# Patient Record
Sex: FEMALE | Race: WHITE | NOT HISPANIC OR LATINO | Employment: FULL TIME
[De-identification: names, ages, dates, MRNs, and addresses within clinical notes are randomized per-mention and may not be internally consistent; named-entity substitution may affect disease eponyms.]

---

## 2017-06-24 ENCOUNTER — HEALTH MAINTENANCE LETTER (OUTPATIENT)
Age: 57
End: 2017-06-24

## 2018-02-09 ENCOUNTER — HOSPITAL ENCOUNTER (OUTPATIENT)
Dept: MAMMOGRAPHY | Facility: CLINIC | Age: 58
Discharge: HOME | End: 2018-02-09
Attending: OBSTETRICS & GYNECOLOGY | Admitting: OBSTETRICS & GYNECOLOGY
Payer: COMMERCIAL

## 2018-02-09 DIAGNOSIS — Z12.31 VISIT FOR SCREENING MAMMOGRAM: ICD-10-CM

## 2018-02-09 PROCEDURE — 77067 SCR MAMMO BI INCL CAD: CPT

## 2019-03-27 ENCOUNTER — HOSPITAL ENCOUNTER (OUTPATIENT)
Dept: MAMMOGRAPHY | Facility: CLINIC | Age: 59
Discharge: HOME | End: 2019-03-27
Attending: OBSTETRICS & GYNECOLOGY | Admitting: OBSTETRICS & GYNECOLOGY
Payer: COMMERCIAL

## 2019-03-27 DIAGNOSIS — Z12.31 VISIT FOR SCREENING MAMMOGRAM: ICD-10-CM

## 2019-03-27 PROCEDURE — 77063 BREAST TOMOSYNTHESIS BI: CPT

## 2019-10-02 ENCOUNTER — OFFICE VISIT (OUTPATIENT)
Dept: FAMILY MEDICINE | Facility: CLINIC | Age: 59
End: 2019-10-02

## 2019-10-02 VITALS
WEIGHT: 128 LBS | HEART RATE: 68 BPM | HEIGHT: 67 IN | BODY MASS INDEX: 20.09 KG/M2 | SYSTOLIC BLOOD PRESSURE: 118 MMHG | TEMPERATURE: 97.9 F | OXYGEN SATURATION: 99 % | DIASTOLIC BLOOD PRESSURE: 70 MMHG

## 2019-10-02 DIAGNOSIS — E78.5 HYPERLIPIDEMIA LDL GOAL <130: ICD-10-CM

## 2019-10-02 DIAGNOSIS — Z23 NEED FOR VACCINATION: ICD-10-CM

## 2019-10-02 DIAGNOSIS — Z76.89 HEALTH CARE HOME: ICD-10-CM

## 2019-10-02 DIAGNOSIS — R73.09 ABNORMAL GLUCOSE: ICD-10-CM

## 2019-10-02 LAB
CHOLEST SERPL-MCNC: 237 MG/DL (ref 0–199)
CHOLEST/HDLC SERPL: 3 {RATIO} (ref 0–5)
GLUCOSE SERPL-MCNC: 101 MG/DL (ref 60–99)
HBA1C MFR BLD: 5.7 % (ref 4–7)
HDLC SERPL-MCNC: 83 MG/DL (ref 40–150)
LDLC SERPL CALC-MCNC: 136 MG/DL (ref 0–130)
TRIGL SERPL-MCNC: 88 MG/DL (ref 0–149)

## 2019-10-02 PROCEDURE — 83036 HEMOGLOBIN GLYCOSYLATED A1C: CPT | Performed by: FAMILY MEDICINE

## 2019-10-02 PROCEDURE — 80061 LIPID PANEL: CPT | Mod: GA | Performed by: FAMILY MEDICINE

## 2019-10-02 PROCEDURE — 36415 COLL VENOUS BLD VENIPUNCTURE: CPT | Performed by: FAMILY MEDICINE

## 2019-10-02 PROCEDURE — 90715 TDAP VACCINE 7 YRS/> IM: CPT | Performed by: FAMILY MEDICINE

## 2019-10-02 PROCEDURE — 90471 IMMUNIZATION ADMIN: CPT | Performed by: FAMILY MEDICINE

## 2019-10-02 PROCEDURE — 82947 ASSAY GLUCOSE BLOOD QUANT: CPT | Performed by: FAMILY MEDICINE

## 2019-10-02 PROCEDURE — 99203 OFFICE O/P NEW LOW 30 MIN: CPT | Mod: 25 | Performed by: FAMILY MEDICINE

## 2019-10-02 ASSESSMENT — MIFFLIN-ST. JEOR: SCORE: 1198.23

## 2019-10-02 NOTE — PROGRESS NOTES
"SUBJECTIVE:  58 year old female referred to our clinic by Dr Jung with concerns of high cholesterol and high blood sugar  Past medical history is significant for good health.  She exercises four days a week, (walks)  Nonsmoker  Diet: reviewed- trying to eat less sweets    Family   Mother high cholesterol and hypertension  Father  87 vascular disease, bilateral amputation, nonsmoker, no diabetis, had asthma  Two sisters- one 62, 56 - both in good health    Surgeries:none  Vaginal delivery  One pregnancy: no complications (no hypertension or gestational diabetis)    No medical problems:    Medications:  No prescription medications  Takes calcium- no vitamin D    Works at [employer]    Prefers Lifestyle over medication  Weight unchanged: Body mass index is 20.05 kg/m .    Nonsmoker        ROS: 10 point ROS neg other than the symptoms noted above in the HPI.    /70 (BP Location: Right arm, Patient Position: Sitting, Cuff Size: Adult Regular)   Pulse 68   Temp 97.9  F (36.6  C) (Oral)   Ht 1.702 m (5' 7\")   Wt 58.1 kg (128 lb)   SpO2 99%   BMI 20.05 kg/m     No acute distress  No palpable masses or asymmetry of thyroid  No carotid bruits  Regular rate and  Rhythm.  no significant murmurs, clicks, gallops or rubs. No edema or JVD. Chest is clear; no wheezes or rales.    Lab results:  Hemoglobin A1C 5.7  Glucose   Date Value Ref Range Status   10/02/2019 101 (A) 60 - 99 mg/dL Final      Lab Results   Component Value Date    CHOL 237 10/02/2019     Lab Results   Component Value Date    HDL 83 10/02/2019     Lab Results   Component Value Date     10/02/2019     Lab Results   Component Value Date    TRIG 88 10/02/2019     Lab Results   Component Value Date    CHOLHDLRATIO 3 10/02/2019     Assessment    (R73.09) Abnormal glucose  Comment: mild elevation in fasting glucose and Hemoglobin A1C  Treatment is healthy diet- regular exercise  Repeat fasting glucose in one year  Plan: Hemoglobin A1c (BFP), " VENOUS COLLECTION,         Glucose Fasting (BFP)            (Z13.220) Lipid screening  Comment: mild elevation in LDL cholesterol 138 (goal is less than 130 with her lack of other risk factors but family history)  Treatment is diet and exercise (high fiber, low fat diet)  Repeat fasting lipid profile in one year  Plan: VENOUS COLLECTION, Lipid Panel (BFP), CANCELED:        Lipid Profile            (Z23) Need for vaccination  Comment:   Plan: TDAP VACCINE            (Z76.89) Health Care Home  Comment:   Plan:     Recheck in one year

## 2019-10-02 NOTE — LETTER
October 2, 2019      Ting Copeland  [address] [place] [address]        Dear ,    We are writing to inform you of your test results.    Mild elevation in blood sugar:101 (normal fasting blood sugar is less than 100)  Try to commit to a daily exercise program-  Avoid foods made with sugar or white flour, potatoes, white noodles and white rice.  Carbohydrates in your diet should be primarily fruits, vegetables and whole grains.    Lipid profile:  Excellent HDL or good cholesterol: 83 (goal is above 50- the higher the better)  Normal triglycerides  Mild elevation in LDL or bad cholesterol 136 (goal is less than 130)- will improve with a high fiber, low fat diet and regular exercise     Repeat your fasting blood sugar and lipid profile in one year.      Resulted Orders   Hemoglobin A1c (BFP)   Result Value Ref Range    Hemoglobin A1C 5.7 4.0 - 7.0 %   Lipid Panel (BFP)   Result Value Ref Range    Cholesterol 237 (A) 0 - 199 mg/dL    Triglycerides 88 0 - 149 mg/dL    HDL Cholesterol 83 40 - 150 mg/dL    LDL Cholesterol Direct 136 (A) 0 - 130 mg/dL    Cholesterol/HDL Ratio 3 0 - 5   Glucose Fasting (BFP)   Result Value Ref Range    Glucose 101 (A) 60 - 99 mg/dL       If you have any questions or concerns, please call the clinic at the number listed above.       Sincerely,        Rehana Orr MD

## 2019-10-02 NOTE — LETTER
[facility]  [address]  [address]  [facility] [address]  [phone]      October 2, 2019      Ting Copeland  [address]  [facility] [address]      EMERGENCY CARE PLAN  Presenting Problem Treatment Plan   Questions or concerns during clinic hours I will call the clinic directly:    [facility]  [address] , [address]  [place] [address]  [phone]   Questions or concerns outside clinic hours  I will call the 24 hour line at [phone]   Patient needs to schedule an appointment  I will call the  scheduling line at [phone]   Same day treatment   I will call the clinic first, then  urgent care and/or  express care if needed   Clinic Care Coordinators Lyndsay Díaz RN:  [phone]  [facility] Clinical Support Staff: [phone]    Crisis Services:  Behavioral or Mental Health [facility] ([facility])   [phone]   Emergency treatment--Immediately CALL [phone]

## 2019-10-02 NOTE — NURSING NOTE
Chief Complaint   Patient presents with     Consult     Glucose and Cholesterol were tali at her OB, wans to recheck them, fasting     Pre-visit Screening:  Immunizations:  not up to date - Tdap  Colonoscopy:  NA  Mammogram: is up to date  Asthma Action Test/Plan:  NA  PHQ9:  PHQ-2 given  GAD7:  NA  Questioned patient about current smoking habits Pt. has never smoked.  Ok to leave detailed message on voice mail for today's visit only yes, phone # [phone]

## 2019-10-13 PROBLEM — R73.09 ABNORMAL GLUCOSE: Status: ACTIVE | Noted: 2019-10-13

## 2019-10-13 PROBLEM — E78.5 HYPERLIPIDEMIA LDL GOAL <130: Status: ACTIVE | Noted: 2019-10-13

## 2020-11-24 ENCOUNTER — ALLIED HEALTH/NURSE VISIT (OUTPATIENT)
Dept: FAMILY MEDICINE | Facility: CLINIC | Age: 60
End: 2020-11-24

## 2020-11-24 DIAGNOSIS — Z23 NEED FOR VACCINATION: Primary | ICD-10-CM

## 2020-11-24 PROCEDURE — 90750 HZV VACC RECOMBINANT IM: CPT | Performed by: FAMILY MEDICINE

## 2020-11-24 PROCEDURE — 90471 IMMUNIZATION ADMIN: CPT | Performed by: FAMILY MEDICINE

## 2021-01-21 ENCOUNTER — IMMUNIZATION (OUTPATIENT)
Dept: PEDIATRICS | Facility: CLINIC | Age: 61
End: 2021-01-21
Payer: COMMERCIAL

## 2021-01-21 PROCEDURE — 91300 PR COVID VAC PFIZER DIL RECON 30 MCG/0.3 ML IM: CPT

## 2021-01-21 PROCEDURE — 0001A PR COVID VAC PFIZER DIL RECON 30 MCG/0.3 ML IM: CPT

## 2021-01-24 ENCOUNTER — HEALTH MAINTENANCE LETTER (OUTPATIENT)
Age: 61
End: 2021-01-24

## 2021-02-02 ENCOUNTER — HOSPITAL ENCOUNTER (OUTPATIENT)
Dept: MAMMOGRAPHY | Facility: CLINIC | Age: 61
Discharge: HOME | End: 2021-02-02
Attending: OBSTETRICS & GYNECOLOGY | Admitting: OBSTETRICS & GYNECOLOGY
Payer: COMMERCIAL

## 2021-02-02 DIAGNOSIS — Z12.31 VISIT FOR SCREENING MAMMOGRAM: ICD-10-CM

## 2021-02-02 PROCEDURE — 77063 BREAST TOMOSYNTHESIS BI: CPT

## 2021-02-11 ENCOUNTER — IMMUNIZATION (OUTPATIENT)
Dept: PEDIATRICS | Facility: CLINIC | Age: 61
End: 2021-02-11
Attending: INTERNAL MEDICINE
Payer: COMMERCIAL

## 2021-02-11 PROCEDURE — 0002A PR COVID VAC PFIZER DIL RECON 30 MCG/0.3 ML IM: CPT

## 2021-02-11 PROCEDURE — 91300 PR COVID VAC PFIZER DIL RECON 30 MCG/0.3 ML IM: CPT

## 2021-02-12 ENCOUNTER — OFFICE VISIT (OUTPATIENT)
Dept: FAMILY MEDICINE | Facility: CLINIC | Age: 61
End: 2021-02-12

## 2021-02-12 VITALS
SYSTOLIC BLOOD PRESSURE: 142 MMHG | HEIGHT: 67 IN | WEIGHT: 133.6 LBS | BODY MASS INDEX: 20.97 KG/M2 | HEART RATE: 84 BPM | DIASTOLIC BLOOD PRESSURE: 80 MMHG | TEMPERATURE: 98.3 F

## 2021-02-12 DIAGNOSIS — Z71.89 ACP (ADVANCE CARE PLANNING): ICD-10-CM

## 2021-02-12 DIAGNOSIS — N30.01 ACUTE CYSTITIS WITH HEMATURIA: Primary | ICD-10-CM

## 2021-02-12 LAB
ALBUMIN (URINE): ABNORMAL MG/DL
APPEARANCE UR: ABNORMAL
BACTERIA, UR: ABNORMAL
BILIRUB UR QL: ABNORMAL
CASTS/LPF: 0
COLOR UR: YELLOW
EP/HPF: ABNORMAL
GLUCOSE URINE: ABNORMAL MG/DL
HGB UR QL: ABNORMAL
KETONES UR QL: ABNORMAL MG/DL
LEUKOCYTE ESTERASE - QUEST: ABNORMAL
MISC.: ABNORMAL
NITRITE UR QL STRIP: ABNORMAL
PH UR STRIP: 6 PH (ref 5–7)
RBC, UR MICRO: ABNORMAL (ref ?–2)
SP. GRAVITY: >=1.03
UROBILINOGEN UR QL STRIP: 0.2 EU/DL (ref 0.2–1)
WBC, UR MICRO: ABNORMAL (ref ?–2)

## 2021-02-12 PROCEDURE — 81001 URINALYSIS AUTO W/SCOPE: CPT | Performed by: PHYSICIAN ASSISTANT

## 2021-02-12 PROCEDURE — 87186 SC STD MICRODIL/AGAR DIL: CPT | Mod: 90 | Performed by: PHYSICIAN ASSISTANT

## 2021-02-12 PROCEDURE — 87088 URINE BACTERIA CULTURE: CPT | Mod: 90 | Performed by: PHYSICIAN ASSISTANT

## 2021-02-12 PROCEDURE — 87086 URINE CULTURE/COLONY COUNT: CPT | Mod: 90 | Performed by: PHYSICIAN ASSISTANT

## 2021-02-12 PROCEDURE — 99213 OFFICE O/P EST LOW 20 MIN: CPT | Performed by: PHYSICIAN ASSISTANT

## 2021-02-12 PROCEDURE — 87077 CULTURE AEROBIC IDENTIFY: CPT | Mod: 90 | Performed by: PHYSICIAN ASSISTANT

## 2021-02-12 RX ORDER — SULFAMETHOXAZOLE/TRIMETHOPRIM 800-160 MG
1 TABLET ORAL 2 TIMES DAILY
Qty: 10 TABLET | Refills: 0 | Status: SHIPPED | OUTPATIENT
Start: 2021-02-12 | End: 2021-02-17

## 2021-02-12 ASSESSMENT — MIFFLIN-ST. JEOR: SCORE: 1213.64

## 2021-02-12 NOTE — NURSING NOTE
Ting Copeland is here for a possible UTI.  Questioned patient about current smoking habits.  Pt. has never smoked.  PULSE regular  My Chart: active  CLASSIFICATION OF OVERWEIGHT AND OBESITY BY BMI                        Obesity Class           BMI(kg/m2)  Underweight                                    < 18.5  Normal                                         18.5-24.9  Overweight                                     25.0-29.9  OBESITY                     I                  30.0-34.9                             II                 35.0-39.9  EXTREME OBESITY             III                >40                            Patient's  BMI Body mass index is 20.92 kg/m .  http://hin.nhlbi.nih.gov/menuplanner/menu.cgi  Pre-visit planning  Immunizations - up to date  Colonoscopy -   Mammogram -   Asthma -   PHQ9 -    ROSEANN-7 -

## 2021-02-12 NOTE — PROGRESS NOTES
"CC: UTI    History:  This morning waking up noticed more urinary urgency, as well as frequency. Some abdominal pressure, but no back pain. Denies any pain, Denies any changes to urine. No vaginal discharge, discomfort, itching. Had 2nd covid-19 vaccine yesterday. Did have some achiness and chills early this morning, but no fever.     Does have a history of pyelonephritis 6 years ago.    PMH, MEDICATIONS, ALLERGIES, SOCIAL AND FAMILY HISTORY in [facility] and reviewed by me personally.    ROS negative other than the symptoms noted above in the HPI.      Examination   BP (!) 142/80 (BP Location: Left arm, Patient Position: Chair, Cuff Size: Adult Regular)   Pulse 84   Temp 98.3  F (36.8  C) (Oral)   Ht 1.702 m (5' 7\")   Wt 60.6 kg (133 lb 9.6 oz)   BMI 20.92 kg/m         Constitutional: Sitting comfortably, in no acute distress. Vital signs noted  Neck:  no adenopathy, trachea midline and normal to palpation  Cardiovascular:  regular rate and rhythm, no murmurs, clicks, or gallops  Respiratory:  normal respiratory rate and rhythm, lungs clear to auscultation  M/S: No CVA tenderness  SKIN: No jaundice/pallor/rash.   Psychiatric: mentation appears normal and affect normal/bright        A/P    ICD-10-CM    1. Acute cystitis with hematuria  N30.01 HCL  Urinalysis, Routine (BFP)     Urine Culture Aerobic Bacterial     sulfamethoxazole-trimethoprim (BACTRIM DS) 800-160 MG tablet   2. ACP (advance care planning)  Z71.89        DISCUSSION:  UA consistent with cystitis. Culture pending. Will MyChart with results when available. Recommended 5 day course of Bactrim. Warned of side effects, take with food, consider probiotic/active culture yogurt in between.     follow up visit: As needed    Yolanda Payton PA-C  [facility]    "

## 2021-02-15 LAB — URINE VOIDED CULTURE: ABNORMAL

## 2021-09-04 ENCOUNTER — HEALTH MAINTENANCE LETTER (OUTPATIENT)
Age: 61
End: 2021-09-04

## 2022-02-19 ENCOUNTER — HEALTH MAINTENANCE LETTER (OUTPATIENT)
Age: 62
End: 2022-02-19

## 2022-05-04 ENCOUNTER — HOSPITAL ENCOUNTER (OUTPATIENT)
Dept: MAMMOGRAPHY | Facility: CLINIC | Age: 62
Discharge: HOME | End: 2022-05-04
Attending: OBSTETRICS & GYNECOLOGY | Admitting: OBSTETRICS & GYNECOLOGY
Payer: COMMERCIAL

## 2022-05-04 DIAGNOSIS — Z12.31 VISIT FOR SCREENING MAMMOGRAM: ICD-10-CM

## 2022-05-04 PROCEDURE — 77067 SCR MAMMO BI INCL CAD: CPT

## 2022-10-22 ENCOUNTER — HEALTH MAINTENANCE LETTER (OUTPATIENT)
Age: 62
End: 2022-10-22

## 2022-12-15 ENCOUNTER — OFFICE VISIT (OUTPATIENT)
Dept: FAMILY MEDICINE | Facility: CLINIC | Age: 62
End: 2022-12-15

## 2022-12-15 VITALS
HEART RATE: 74 BPM | SYSTOLIC BLOOD PRESSURE: 148 MMHG | HEIGHT: 68 IN | DIASTOLIC BLOOD PRESSURE: 86 MMHG | TEMPERATURE: 97.1 F | WEIGHT: 134 LBS | OXYGEN SATURATION: 99 % | BODY MASS INDEX: 20.31 KG/M2

## 2022-12-15 DIAGNOSIS — R30.0 DYSURIA: Primary | ICD-10-CM

## 2022-12-15 LAB
APPEARANCE UR: CLEAR
BACTERIA, UR: ABNORMAL
BILIRUB UR QL: ABNORMAL
CASTS/LPF: ABNORMAL
COLOR UR: YELLOW
EP/HPF: ABNORMAL
GLUCOSE URINE: ABNORMAL MG/DL
HGB UR QL: ABNORMAL
KETONES UR QL: ABNORMAL MG/DL
MISC.: ABNORMAL
NITRITE UR QL STRIP: ABNORMAL
PH UR STRIP: 6 PH (ref 5–7)
PROT UR QL: ABNORMAL MG/DL
RBC, UR MICRO: ABNORMAL (ref ?–2)
SP GR UR STRIP: 1.01 (ref 1–1.03)
UROBILINOGEN UR QL STRIP: 0.2 EU/DL (ref 0.2–1)
WBC #/AREA URNS HPF: ABNORMAL /[HPF]
WBC, UR MICRO: ABNORMAL (ref ?–2)

## 2022-12-15 PROCEDURE — 81001 URINALYSIS AUTO W/SCOPE: CPT | Performed by: FAMILY MEDICINE

## 2022-12-15 PROCEDURE — 99213 OFFICE O/P EST LOW 20 MIN: CPT | Performed by: FAMILY MEDICINE

## 2022-12-15 NOTE — NURSING NOTE
Chief Complaint   Patient presents with     UTI     Urgency to urinate on and off for a week, has cut back on caffeine intake         Pre-visit Screening:  Immunizations:  up to date- declines flu vac today  Colonoscopy:  is up to date-- need records from [facility]  Mammogram: NA  Asthma Action Test/Plan:  PINO  PHQ9:  PINO  GAD7:  PINO  Questioned patient about current smoking habits Pt. has never smoked.  Ok to leave detailed message on voice mail for today's visit only Yes, phone # [phone]

## 2022-12-15 NOTE — PROGRESS NOTES
SUBJECTIVE: Ting Copeland is a 62 year old female who complains of urinary frequency, urgency without dysuria x 7 days, without flank pain, fever, chills, or abnormal vaginal discharge or bleeding.     Pt leaving for [place] for [place] and wanted checked    Patient Active Problem List   Diagnosis     Health Care Home     Abnormal glucose     Hyperlipidemia LDL goal <130     ACP (advance care planning)     No past medical history on file.  Family History   Problem Relation Age of Onset     Hypertension Mother      Hyperlipidemia Mother      Vascular Disease Father      Asthma Father      Social History     Socioeconomic History     Marital status:      Spouse name: Not on file     Number of children: Not on file     Years of education: Not on file     Highest education level: Not on file   Occupational History     Not on file   Tobacco Use     Smoking status: Never     Smokeless tobacco: Never   Substance and Sexual Activity     Alcohol use: Yes     Comment: socially     Drug use: Not on file     Sexual activity: Not on file   Other Topics Concern     Not on file   Social History Narrative     Not on file     Social Determinants of Health     Financial Resource Strain: Not on file   Food Insecurity: Not on file   Transportation Needs: Not on file   Physical Activity: Not on file   Stress: Not on file   Social Connections: Not on file   Intimate Partner Violence: Not on file   Housing Stability: Not on file     Past Surgical History:   Procedure Laterality Date     NO HISTORY OF SURGERY       No current outpatient medications on file prior to visit.  No current facility-administered medications on file prior to visit.       Allergies: Patient has no known allergies.    Immunization History   Administered Date(s) Administered     COVID-19 Vaccine 12+ (Pfizer) 01/21/2021, 02/11/2021, 11/14/2021     COVID-19 Vaccine Bivalent Booster 12+ (Pfizer) 10/08/2022     HepB-Adult 11/07/2014, 01/02/2015, 03/25/2015     TDAP  Vaccine (Boostrix) 10/02/2019     Td (Adult), Adsorbed 06/28/2004     Zoster vaccine recombinant adjuvanted (SHINGRIX) 11/24/2020, 04/09/2021        OBJECTIVE: Appears well, in no apparent distress.  Vital signs are normal. The abdomen is soft without tenderness, guarding, mass, rebound or organomegaly. No CVA tenderness or inguinal adenopathy noted. Urine dipstick shows negative for all components.  Micro exam: negative for WBC's or RBC's.     ASSESSMENT: Urinary urgency/frequency-normal UA-? Some vaginal atrophy but no pain    PLAN: push fluids, reassurred, f/u if not improving or worsening

## 2023-02-15 ENCOUNTER — OFFICE VISIT (OUTPATIENT)
Dept: FAMILY MEDICINE | Facility: CLINIC | Age: 63
End: 2023-02-15

## 2023-02-15 VITALS
SYSTOLIC BLOOD PRESSURE: 136 MMHG | DIASTOLIC BLOOD PRESSURE: 80 MMHG | HEART RATE: 67 BPM | HEIGHT: 68 IN | WEIGHT: 135 LBS | BODY MASS INDEX: 20.46 KG/M2 | OXYGEN SATURATION: 97 % | TEMPERATURE: 97 F

## 2023-02-15 DIAGNOSIS — L30.1 DYSHIDROTIC ECZEMA: Primary | ICD-10-CM

## 2023-02-15 PROCEDURE — 99213 OFFICE O/P EST LOW 20 MIN: CPT | Performed by: PHYSICIAN ASSISTANT

## 2023-02-15 RX ORDER — TRIAMCINOLONE ACETONIDE 1 MG/G
CREAM TOPICAL 2 TIMES DAILY
Qty: 30 G | Refills: 0 | Status: SHIPPED | OUTPATIENT
Start: 2023-02-15 | End: 2023-03-01

## 2023-02-15 NOTE — NURSING NOTE
Chief Complaint   Patient presents with     Derm Problem     Left middle toe has been itching and red/bumps- possibly spreading to other toe         Pre-visit Screening:  Immunizations:  up to date  Colonoscopy:  is up to date-- need records from [facility]  Mammogram: will schedule  Asthma Action Test/Plan:  PINO  PHQ9:  NA  GAD7:  NA  Questioned patient about current smoking habits Pt. has never smoked.  Ok to leave detailed message on voice mail for today's visit only Yes, phone # [phone]

## 2023-02-15 NOTE — PROGRESS NOTES
"CC: Foot rash    History:  2.5 week ago, started to have redness and itching at reji of left foot 2nd toe. Small red bump. Since that time, several red bumps and more pronounced. No drainage/discharge. Seemed to spread toward great toe, and this morning looked worse. Has been trying topical Aquaphor, Neosporin, Benedryl without much relief. Did apple cider vinegar warm water soak. Only possible trigger was sitting on feet on cement floor of storage room and started to feel pain. No fever, sweats, chills, SOB, chest pain, spreading rash.     PMH, MEDICATIONS, ALLERGIES, SOCIAL AND FAMILY HISTORY in [facility] and reviewed by me personally.    ROS negative other than the symptoms noted above in the HPI.    Examination   /80 (BP Location: Left arm, Patient Position: Sitting, Cuff Size: Adult Regular)   Pulse 67   Temp 97  F (36.1  C) (Temporal)   Ht 1.715 m (5' 7.5\")   Wt 61.2 kg (135 lb)   SpO2 97%   BMI 20.83 kg/m       Constitutional: Sitting comfortably, in no acute distress. Vital signs noted  Neck:  no adenopathy, trachea midline and normal to palpation, thyroid normal to palpation  Cardiovascular:  regular rate and rhythm, no murmurs, clicks, or gallops  Respiratory:  normal respiratory rate and rhythm, lungs clear to auscultation  M/S: ROM of toes intact. No edema. DP, PT pulses intact and equal bilaterally.  NEURO:  muscle strength normal, sensation to light touch and pinprick normal  SKIN: small erythematous vesicles between base of great toe and second digit dorsal surface. No discharge. Fine scaling.   Psychiatric: mentation appears normal and affect normal/brightmentation appears normal and affect normal/bright    A/P    ICD-10-CM    1. Dyshidrotic eczema  L30.1 triamcinolone (KENALOG) 0.1 % external cream          DISCUSSION:  Suspect this is dyshidrotic eczema. Recommended course of topical triamcinolone twice daily. Warned of possible side effects of topical steroid therapy, and advised to monitor " closely for possibly permanent skin changes like thinning, whitening of the skin, and stop immediately if noted.     Contact me in 1 week if not significantly better, or sooner with worsening. May consider prednisone burst at that time if not resolved/nearly resolved.     follow up visit: As needed    Yolanda Robledo PA-C  [facility]

## 2023-02-20 ENCOUNTER — MYC MEDICAL ADVICE (OUTPATIENT)
Dept: FAMILY MEDICINE | Facility: CLINIC | Age: 63
End: 2023-02-20

## 2023-04-01 ENCOUNTER — HEALTH MAINTENANCE LETTER (OUTPATIENT)
Age: 63
End: 2023-04-01

## 2023-04-26 ENCOUNTER — LAB REQUISITION (OUTPATIENT)
Dept: LAB | Facility: CLINIC | Age: 63
End: 2023-04-26

## 2023-04-26 DIAGNOSIS — Z13.1 ENCOUNTER FOR SCREENING FOR DIABETES MELLITUS: ICD-10-CM

## 2023-04-26 DIAGNOSIS — Z13.220 ENCOUNTER FOR SCREENING FOR LIPOID DISORDERS: ICD-10-CM

## 2023-04-26 LAB
CHOLEST SERPL-MCNC: 244 MG/DL
FASTING STATUS PATIENT QL REPORTED: YES
GLUCOSE SERPL-MCNC: 109 MG/DL (ref 70–99)
HDLC SERPL-MCNC: 80 MG/DL
LDLC SERPL CALC-MCNC: 146 MG/DL
NONHDLC SERPL-MCNC: 164 MG/DL
TRIGL SERPL-MCNC: 91 MG/DL

## 2023-04-26 PROCEDURE — 82947 ASSAY GLUCOSE BLOOD QUANT: CPT | Performed by: OBSTETRICS & GYNECOLOGY

## 2023-04-26 PROCEDURE — 80061 LIPID PANEL: CPT | Performed by: OBSTETRICS & GYNECOLOGY

## 2023-05-05 ENCOUNTER — HOSPITAL ENCOUNTER (OUTPATIENT)
Dept: MAMMOGRAPHY | Facility: CLINIC | Age: 63
Discharge: HOME | End: 2023-05-05
Attending: OBSTETRICS & GYNECOLOGY | Admitting: OBSTETRICS & GYNECOLOGY
Payer: COMMERCIAL

## 2023-05-05 DIAGNOSIS — Z12.31 ENCOUNTER FOR SCREENING MAMMOGRAM FOR MALIGNANT NEOPLASM OF BREAST: ICD-10-CM

## 2023-05-05 PROCEDURE — 77067 SCR MAMMO BI INCL CAD: CPT

## 2024-01-15 ENCOUNTER — OFFICE VISIT (OUTPATIENT)
Dept: FAMILY MEDICINE | Facility: CLINIC | Age: 64
End: 2024-01-15

## 2024-01-15 VITALS
HEIGHT: 68 IN | TEMPERATURE: 97.9 F | WEIGHT: 135 LBS | BODY MASS INDEX: 20.46 KG/M2 | HEART RATE: 68 BPM | DIASTOLIC BLOOD PRESSURE: 74 MMHG | SYSTOLIC BLOOD PRESSURE: 138 MMHG | RESPIRATION RATE: 20 BRPM | OXYGEN SATURATION: 99 %

## 2024-01-15 DIAGNOSIS — J02.9 SORE THROAT: ICD-10-CM

## 2024-01-15 DIAGNOSIS — J02.9 VIRAL PHARYNGITIS: Primary | ICD-10-CM

## 2024-01-15 LAB — STREP A: NEGATIVE

## 2024-01-15 PROCEDURE — 87651 STREP A DNA AMP PROBE: CPT

## 2024-01-15 PROCEDURE — 99213 OFFICE O/P EST LOW 20 MIN: CPT

## 2024-01-15 NOTE — NURSING NOTE
Ting Copeland is here for a sore throat and also left ear pain. Throat for the past 4 days, ears the past 2    Questioned patient about current smoking habits.  Pt. has never smoked.  PULSE regular  My Chart: active  CLASSIFICATION OF OVERWEIGHT AND OBESITY BY BMI                        Obesity Class           BMI(kg/m2)  Underweight                                    < 18.5  Normal                                         18.5-24.9  Overweight                                     25.0-29.9  OBESITY                     I                  30.0-34.9                             II                 35.0-39.9  EXTREME OBESITY             III                >40                            Patient's  BMI Body mass index is 20.83 kg/m .  http://hin.nhlbi.nih.gov/menuplanner/menu.cgi  Pre-visit planning  Immunizations - up to date  Colonoscopy - is up to date  Mammogram - is up to date  Asthma -   PHQ9 -    ROSEANN-7 -      The patient has verbalized that it is ok to leave a detailed voice message on the patient's cell phone with results/recommendations from this visit.

## 2024-01-15 NOTE — PROGRESS NOTES
"Assessment & Plan     1. Viral pharyngitis  - Discussed with Ting she has viral pharyngitis, strep is negative. I did offer to test her for COVID today, however she declines at this time, recommend she does this before her upcoming trip if she is still having symptoms. Recommend she continue to rest, push plenty of fluids (warm liquids such as tea), Ibuprofen and Tylenol for pain control, salt water gargles, etc. Return to clinic and ER precautions discussed.     2. Sore throat  - See above, strep negative.   - Strep A (BFP)    Follow up as needed. Reasons to follow-up sooner or seek emergent care reviewed.     Seda Franklin PA-C  [facility]       Subjective     Ting Copeland is a 63 year old female who presents to clinic today for the following health issues:    HPI   Chief Complaint   Patient presents with     Pharyngitis      Ting presents with concerns of a sore throat. She notes her sore throat started 4 days ago on (01/11/24) and she also has left ear pain that started two days ago. She also has a slight cough and some nasal congestion that started a couple of days ago. She denies any fevers/chills, body aches, right ear pain, wheezing, shortness of breath, chest pain, or any GI symptoms. She denies any decreased hearing, drainage, or tinnitus in her left ear. She has not been around anyone ill that she knows of although notes her and her  both got a head cold that started around the same time. She has been taking Ibuprofen and Zicam for her symptoms. She has not done an at home COVID test. She notes she did have strep once 20 years ago and notes she is leaving to [place] at the end of this week and wants to make sure she doesn't have strep.     She does not take any daily medications.         Objective    /74 (BP Location: Right arm, Patient Position: Chair, Cuff Size: Adult Regular)   Pulse 68   Temp 97.9  F (36.6  C) (Temporal)   Resp 20   Ht 1.715 m (5' 7.5\")   Wt " 61.2 kg (135 lb)   SpO2 99%   BMI 20.83 kg/m    Body mass index is 20.83 kg/m .    Physical Examination:  GENERAL: healthy, alert and no distress  EYES: Eyes grossly normal to inspection, PERRL and conjunctivae and sclerae normal  HENT: ear canals and TM's normal, mild congestion present, posterior oropharyngeal erythema present, tonsils 2+ with no exudates, uvula is midline, mouth free of any ulcers or lesions  NECK: no adenopathy, no asymmetry, masses, or scars and thyroid normal to palpation  RESP: lungs clear to auscultation - no rales, rhonchi or wheezes  CV: regular rate and rhythm, normal S1 S2, no S3 or S4, no murmur, click or rub, no peripheral edema   ABDOMEN: soft, nontender, no masses  MS: no gross musculoskeletal defects noted, no edema  SKIN: no suspicious lesions or rashes  NEURO: Normal strength and tone, mentation intact and speech normal  PSYCH: mentation appears normal, affect normal/bright    Labs reviewed.  Results for orders placed or performed in visit on 01/15/24 (from the past 24 hour(s))   Strep A (BFP)   Result Value Ref Range    STREP A Negative Negative

## 2024-06-02 ENCOUNTER — HEALTH MAINTENANCE LETTER (OUTPATIENT)
Age: 64
End: 2024-06-02

## 2024-06-17 PROBLEM — Z76.89 HEALTH CARE HOME: Status: RESOLVED | Noted: 2019-10-02 | Resolved: 2024-06-17

## 2024-07-10 ENCOUNTER — HOSPITAL ENCOUNTER (OUTPATIENT)
Dept: MAMMOGRAPHY | Facility: CLINIC | Age: 64
Discharge: HOME | End: 2024-07-10
Attending: PHYSICIAN ASSISTANT | Admitting: PHYSICIAN ASSISTANT
Payer: COMMERCIAL

## 2024-07-10 DIAGNOSIS — Z12.31 VISIT FOR SCREENING MAMMOGRAM: ICD-10-CM

## 2024-07-10 PROCEDURE — 77063 BREAST TOMOSYNTHESIS BI: CPT

## 2024-07-24 ENCOUNTER — OFFICE VISIT (OUTPATIENT)
Dept: FAMILY MEDICINE | Facility: CLINIC | Age: 64
End: 2024-07-24

## 2024-07-24 VITALS
WEIGHT: 128 LBS | HEART RATE: 72 BPM | HEIGHT: 68 IN | OXYGEN SATURATION: 99 % | BODY MASS INDEX: 19.4 KG/M2 | SYSTOLIC BLOOD PRESSURE: 130 MMHG | TEMPERATURE: 97.2 F | DIASTOLIC BLOOD PRESSURE: 74 MMHG

## 2024-07-24 DIAGNOSIS — Z82.62 FH: OSTEOPOROSIS: ICD-10-CM

## 2024-07-24 DIAGNOSIS — L65.9 HAIR LOSS: ICD-10-CM

## 2024-07-24 DIAGNOSIS — Z13.820 SCREENING FOR OSTEOPOROSIS: ICD-10-CM

## 2024-07-24 DIAGNOSIS — Z13.220 SCREENING FOR LIPID DISORDERS: ICD-10-CM

## 2024-07-24 DIAGNOSIS — E03.8 SUBCLINICAL HYPOTHYROIDISM: ICD-10-CM

## 2024-07-24 DIAGNOSIS — Z00.00 ENCOUNTER FOR GENERAL HEALTH EXAMINATION: Primary | ICD-10-CM

## 2024-07-24 DIAGNOSIS — Z13.228 SCREENING FOR METABOLIC DISORDER: ICD-10-CM

## 2024-07-24 LAB
BUN SERPL-MCNC: 19 MG/DL (ref 7–25)
BUN/CREATININE RATIO: 23 (ref 6–32)
CALCIUM SERPL-MCNC: 10 MG/DL (ref 8.6–10.3)
CHLORIDE SERPLBLD-SCNC: 102.7 MMOL/L (ref 98–110)
CHOLEST SERPL-MCNC: 276 MG/DL (ref 0–199)
CHOLEST/HDLC SERPL: 3 {RATIO} (ref 0–5)
CO2 SERPL-SCNC: 25.5 MMOL/L (ref 20–32)
CREAT SERPL-MCNC: 0.81 MG/DL (ref 0.6–1.3)
ERYTHROCYTE [DISTWIDTH] IN BLOOD BY AUTOMATED COUNT: 13.6 %
GLUCOSE SERPL-MCNC: 98 MG/DL (ref 60–99)
HCT VFR BLD AUTO: 44.6 % (ref 35–47)
HDLC SERPL-MCNC: 96 MG/DL (ref 40–150)
HEMOGLOBIN: 14.4 G/DL (ref 11.7–15.7)
LDLC SERPL CALC-MCNC: 165 MG/DL
MCH RBC QN AUTO: 28.4 PG (ref 26–33)
MCHC RBC AUTO-ENTMCNC: 32.3 G/DL (ref 31–36)
MCV RBC AUTO: 87.9 FL (ref 78–100)
PLATELET COUNT - QUEST: 153 10^9/L (ref 150–375)
POTASSIUM SERPL-SCNC: 4.13 MMOL/L (ref 3.5–5.3)
RBC # BLD AUTO: 5.07 10*12/L (ref 3.8–5.2)
SODIUM SERPL-SCNC: 138.8 MMOL/L (ref 135–146)
TRIGL SERPL-MCNC: 75 MG/DL (ref 0–149)
WBC # BLD AUTO: 4.5 10*9/L (ref 4–11)

## 2024-07-24 PROCEDURE — 85027 COMPLETE CBC AUTOMATED: CPT | Performed by: PHYSICIAN ASSISTANT

## 2024-07-24 PROCEDURE — 36415 COLL VENOUS BLD VENIPUNCTURE: CPT | Performed by: PHYSICIAN ASSISTANT

## 2024-07-24 PROCEDURE — 99396 PREV VISIT EST AGE 40-64: CPT | Performed by: PHYSICIAN ASSISTANT

## 2024-07-24 PROCEDURE — 80061 LIPID PANEL: CPT | Performed by: PHYSICIAN ASSISTANT

## 2024-07-24 PROCEDURE — 80048 BASIC METABOLIC PNL TOTAL CA: CPT | Performed by: PHYSICIAN ASSISTANT

## 2024-07-24 NOTE — NURSING NOTE
Chief Complaint   Patient presents with    Physical     Annual, fasting, check labs     Pre-visit Screening:  Immunizations:  up to date  Colonoscopy:  is up to date  Mammogram: is up to date  Asthma Action Test/Plan:  na  PHQ9:  phq 2 given  GAD7:  na  Questioned patient about current smoking habits Pt. has never smoked.  Ok to leave detailed message on voice mail for today's visit only yes, phone # [phone] (home) [phone] (work)

## 2024-07-24 NOTE — PROGRESS NOTES
Chief Complaint:  Physical Exam    SUBJECTIVE:   Ting Copeland is a 63 year old female presents for routine health maintenance.    Current concerns: Hair thinning the past 2-3 months- trying Hair LaVie supplement, and Nioxin supplement. No FH history of thyroid disease.     Did have elevated fasting glucose in the past. Has worked on cutting down sugar intake in the past year.     Menses are absent    No LMP recorded. Patient is postmenopausal.    Was last Pap smear normal: Yes  Due for mammogram:  No    Body mass index is 19.75 kg/m .    Present exercise habits:  3-5 times/week  Present dietary habits:  eats regular meals and follows a balanced nutrition diet    Calcium intake:  2-3 servings daily   Vit D intake: is not taking supplement    Is the patient a smoker? No  If yes, smoking cessation advised and counseling provided.     Cardiovascular risk factors: none    Over the past few weeks, have you felt down or depressed? Little interest or pleasure in doing things? No concerns    If in a relationship are there any Domestic violence concern: No    Last dental appointment:  this year  Last optical appointment:  this year    Was the patient born between [id number] and has not had Hep C testing?  Has not been tested, declines testing    I have reviewed the following histories: Past Medical History, Past Surgical History, Social History, Family History, Problem List, Medication List and Allergies    No past medical history on file.  Family History   Problem Relation Age of Onset    Hypertension Mother     Hyperlipidemia Mother     Skin Cancer Mother     Vascular Disease Father         amputation    Asthma Father     Osteoporosis Sister     Skin Cancer Sister     Diabetes Sister     Hyperlipidemia Sister     Skin Cancer Sister     Breast Cancer Paternal Grandmother 80    Breast Cancer Maternal Aunt 60     Social History     Socioeconomic History    Marital status:      Spouse name: Not on file    Number of  "children: Not on file    Years of education: Not on file    Highest education level: Not on file   Occupational History    Not on file   Tobacco Use    Smoking status: Never    Smokeless tobacco: Never   Substance and Sexual Activity    Alcohol use: Yes     Comment: socially, 2-3/week    Drug use: Not on file    Sexual activity: Not on file   Other Topics Concern    Not on file   Social History Narrative    Not on file     Social Determinants of Health     Financial Resource Strain: Not on file   Food Insecurity: Not on file   Transportation Needs: Not on file   Physical Activity: Not on file   Stress: Not on file   Social Connections: Not on file   Interpersonal Safety: Not on file   Housing Stability: Not on file     Past Surgical History:   Procedure Laterality Date    NO HISTORY OF SURGERY         ROS:  E/M: NEGATIVE for ear, nose, mouth and throat problems  R: NEGATIVE for significant/chronic cough or SOB  CV: NEGATIVE for chest pain or palpitations  GI: NEGATIVE for abdominal pain, chronic diarrhea or constipation  :  NEGATIVE for dysuria, hematuria or vaginal discharge. No sexual health concerns.       No current outpatient medications on file.     No current facility-administered medications for this visit.       Patient Active Problem List    Diagnosis Date Noted    ACP (advance care planning) 02/12/2021     Priority: Medium    Abnormal glucose 10/13/2019     Priority: Medium    Hyperlipidemia LDL goal <130 10/13/2019     Priority: Medium       OBJECTIVE:  /74 (BP Location: Left arm, Patient Position: Sitting, Cuff Size: Adult Regular)   Pulse 72   Temp 97.2  F (36.2  C) (Temporal)   Ht 1.715 m (5' 7.5\")   Wt 58.1 kg (128 lb)   SpO2 99%   BMI 19.75 kg/m      General: 63 year old female who appears her stated age. Vital signs noted.  Head: Normocephalic  Eyes: pupils equal round reactive to light and accomodation, extra ocular movements intact  Ears: external canals and TMs free of " "abnormalities  Nose: patent, without mucosal abnormalities  Mouth and throat: without erythema or lesions of the mucosa  Neck: supple, without adenopathy or thyromegaly  Lungs: clear to auscultation, no wheezing or crackles  Breasts: skin without rash, no dominant mass, no nipple discharge, or axillary adenopathy   Cv: regular rate and rhythm, normal s1 and s2 without murmur or click  Abd: soft, non-tender, no masses, no hepatomegaly or splenomegaly.   (female): Declined.  Ms: normal muscle tone & symmetry  Skin: clear to inspection and with no palpable abnormalities.  Neuro: sensation and motor function grossly intact; cranial nerves without obvious abnormalities.    ASSESSMENT/PLAN:    Encounter for general health examination  Ting is doing well today. Will update fasting labs and send MyChart. Also checking TSH, CBC for hair loss/dry mouth. Ordered dexa scan to establish baseline. Will contact with results when available.     Hair loss  - VENOUS COLLECTION  - TSH WITH FREE T4 REFLEX ([facility])  - Hemogram Platelet ([facility])    Screening for lipid disorders  - VENOUS COLLECTION  - Lipid Panel ([facility])    Screening for metabolic disorder  - VENOUS COLLECTION  - Basic Metabolic Panel ([facility])    Screening for osteoporosis  FH: osteoporosis  - Radiology Referral (Affiliate Use Only)  - DX Bone Density     reports that she has never smoked. She has never used smokeless tobacco.      Estimated body mass index is 19.75 kg/m  as calculated from the following:    Height as of this encounter: 1.715 m (5' 7.5\").    Weight as of this encounter: 58.1 kg (128 lb).        Labs pending:      Fasting glucose      Fasting lipids  Meds Suggested:      Vitamin D       Calcium  Tests Recommended:      Regular Dental Examinations        Eye exam        Mammogram yearly  Behavior Modifications:       Cardiovascular exercise 3 times per week--enough to get your Target Heart rate  Other recommendations:     BMI noted and discussed      Regular " breast exam     Encouraged My Chart    Counseling Resources:  ATP IV Guidelines  Pooled Cohorts Equation Calculator  Breast Cancer Risk Calculator  FRAX Risk Assessment  ICSI Preventive Guidelines  Dietary Guidelines for Americans, 2010  [email or web address]'s MyPlate            Yolanda Robledo PA-C  7/24/2024

## 2024-07-25 LAB
T4, FREE, NON-DIALYSIS - QUEST: 1.4 NG/DL (ref 0.8–1.8)
TSH SERPL-ACNC: 4.61 MIU/L (ref 0.4–4.5)

## 2025-08-07 ENCOUNTER — OFFICE VISIT (OUTPATIENT)
Dept: FAMILY MEDICINE | Facility: CLINIC | Age: 65
End: 2025-08-07

## 2025-08-07 VITALS
OXYGEN SATURATION: 100 % | DIASTOLIC BLOOD PRESSURE: 90 MMHG | HEART RATE: 77 BPM | WEIGHT: 129.4 LBS | BODY MASS INDEX: 19.97 KG/M2 | SYSTOLIC BLOOD PRESSURE: 152 MMHG | TEMPERATURE: 97.2 F

## 2025-08-07 DIAGNOSIS — L03.012 ACUTE PARONYCHIA OF FINGER OF LEFT HAND: Primary | ICD-10-CM
